# Patient Record
Sex: MALE | URBAN - METROPOLITAN AREA
[De-identification: names, ages, dates, MRNs, and addresses within clinical notes are randomized per-mention and may not be internally consistent; named-entity substitution may affect disease eponyms.]

---

## 2022-05-01 ENCOUNTER — NURSE TRIAGE (OUTPATIENT)
Dept: NURSING | Facility: CLINIC | Age: 82
End: 2022-05-01

## 2022-05-01 NOTE — TELEPHONE ENCOUNTER
Tuesday seen in [facility] ER.   PCP [facility] @ [facility]. I have blistering on my hand and I can hardly move it. Up to the elbow it feels like there are splinters I am rubbing into my skin. I had a sliver I could not remove from the ring finger on my left hand. I think it is still in there.   Blisters on the end of the finger on the joint x 4-5. On the hand there is a dime sized area that is puffy in spots and whole area is sunken in. No fever noted.  SX began this morning. My whole hand is stiff and I can hardly move the fingers. My left hand does not look swollen. I can't grasp anything with that hand.   He is right handed. Appointment on Tuesday with Dr Gomez. He thinks he should be seen sooner.    Triaged to a disposition of Go to ED now, which he agrees to do.    Neisha Mcfarland RN Triage Nurse Advisor 4:36 PM 5/1/2022  Reason for Disposition    Sounds like a serious injury to the triager    Additional Information    Negative: Serious injury with multiple fractures (broken bones)    Negative: [1] Major bleeding (e.g., actively dripping or spurting) AND [2] can't be stopped    Negative: Amputation    Negative: Sounds like a life-threatening emergency to the triager    Negative: Finger injury is main concern    Negative: Caused by an animal bite    Negative: Caused by a human bite    Negative: Wound looks infected    Negative: Cast problems or questions    Negative: Bullet wound, stabbed by knife, or other serious penetrating wound    Negative: Looks like a broken bone (e.g., knuckle is gone or depressed)    Negative: Looks like a dislocated joint (e.g., crooked or deformed)    Negative: Cut over knuckle (MCP joint)    Negative: Skin is split open or gaping  (or length > 1/2 inch or 12 mm)    Negative: [1] Bleeding AND [2] won't stop after 10 minutes of direct pressure (using correct technique)    Negative: [1] Dirt in the wound AND [2] not removed with 15 minutes of scrubbing     Negative: [1] Numbness (loss of sensation) of finger(s) AND [2] present now    Negative: High pressure injection injury (e.g., from grease gun or paint gun, usually work-related)    Protocols used: HAND AND WRIST INJURY-A-AH  COVID 19 Nurse Triage Plan/Patient Instructions    Please be aware that novel coronavirus (COVID-19) may be circulating in the community. If you develop symptoms such as fever, cough, or SOB or if you have concerns about the presence of another infection including coronavirus (COVID-19), please contact your health care provider or visit [email or web address].     Disposition/Instructions    ED Visit recommended. Follow protocol based instructions.     Bring Your Own Device:  Please also bring your smart device(s) (smart phones, tablets, laptops) and their charging cables for your personal use and to communicate with your care team during your visit.    Thank you for taking steps to prevent the spread of this virus.  o Limit your contact with others.  o Wear a simple mask to cover your cough.  o Wash your hands well and often.    Resources    [facility]: About COVID-19: [email or web address]    CDC: What to Do If You're Sick: www.cdc.gov/coronavirus/2019-ncov/about/steps-when-sick.html    CDC: Ending Home Isolation: www.cdc.gov/coronavirus/2019-ncov/hcp/disposition-in-home-patients.html     CDC: Caring for Someone: www.cdc.gov/coronavirus/2019-ncov/if-you-are-sick/care-for-someone.html     [facility]: Interim Guidance for Hospital Discharge to Home: www.health.[facility].[place]/diseases/coronavirus/hcp/hospdischarge.pdf    [facility] clinical trials (COVID-19 research studies): [email or web address]-clinical-trials     Below are the COVID-19 hotlines at the [place] Department of Health ([facility]). Interpreters are available.   o For health questions: Call 447-885-9629 or 1-286.329.6012 (7 a.m. to 7 p.m.)  o For questions about schools and childcare: Call [phone]  or 1-605.927.1853 (7 a.m. to 7 p.m.)